# Patient Record
Sex: FEMALE | Race: WHITE | NOT HISPANIC OR LATINO | Employment: STUDENT | URBAN - METROPOLITAN AREA
[De-identification: names, ages, dates, MRNs, and addresses within clinical notes are randomized per-mention and may not be internally consistent; named-entity substitution may affect disease eponyms.]

---

## 2021-09-20 PROCEDURE — U0004 COV-19 TEST NON-CDC HGH THRU: HCPCS | Performed by: FAMILY MEDICINE

## 2022-02-25 ENCOUNTER — LAB (OUTPATIENT)
Dept: LAB | Facility: HOSPITAL | Age: 22
End: 2022-02-25

## 2022-02-25 ENCOUNTER — OFFICE VISIT (OUTPATIENT)
Dept: INTERNAL MEDICINE | Facility: CLINIC | Age: 22
End: 2022-02-25

## 2022-02-25 VITALS
TEMPERATURE: 97.1 F | HEIGHT: 64 IN | BODY MASS INDEX: 23.93 KG/M2 | OXYGEN SATURATION: 98 % | HEART RATE: 78 BPM | RESPIRATION RATE: 16 BRPM | DIASTOLIC BLOOD PRESSURE: 72 MMHG | WEIGHT: 140.2 LBS | SYSTOLIC BLOOD PRESSURE: 120 MMHG

## 2022-02-25 DIAGNOSIS — R21 FACIAL RASH: ICD-10-CM

## 2022-02-25 DIAGNOSIS — Z13.29 SCREENING FOR THYROID DISORDER: ICD-10-CM

## 2022-02-25 DIAGNOSIS — Z00.00 HEALTHCARE MAINTENANCE: ICD-10-CM

## 2022-02-25 DIAGNOSIS — R51.9 NEW ONSET HEADACHE: ICD-10-CM

## 2022-02-25 DIAGNOSIS — Z13.1 SCREENING FOR DIABETES MELLITUS: ICD-10-CM

## 2022-02-25 DIAGNOSIS — M25.40 JOINT SWELLING: ICD-10-CM

## 2022-02-25 DIAGNOSIS — M25.40 JOINT SWELLING: Primary | ICD-10-CM

## 2022-02-25 DIAGNOSIS — R42 VERTIGO: ICD-10-CM

## 2022-02-25 DIAGNOSIS — M94.0 COSTOCHONDRITIS: ICD-10-CM

## 2022-02-25 DIAGNOSIS — F41.8 ANXIETY ABOUT HEALTH: ICD-10-CM

## 2022-02-25 LAB
DEPRECATED RDW RBC AUTO: 37.1 FL (ref 37–54)
ERYTHROCYTE [DISTWIDTH] IN BLOOD BY AUTOMATED COUNT: 12.3 % (ref 12.3–15.4)
ERYTHROCYTE [SEDIMENTATION RATE] IN BLOOD: 10 MM/HR (ref 0–20)
HBA1C MFR BLD: 5.1 % (ref 4.8–5.6)
HCT VFR BLD AUTO: 39.7 % (ref 34–46.6)
HGB BLD-MCNC: 13.5 G/DL (ref 12–15.9)
MCH RBC QN AUTO: 28.9 PG (ref 26.6–33)
MCHC RBC AUTO-ENTMCNC: 34 G/DL (ref 31.5–35.7)
MCV RBC AUTO: 85 FL (ref 79–97)
PLATELET # BLD AUTO: 287 10*3/MM3 (ref 140–450)
PMV BLD AUTO: 10.7 FL (ref 6–12)
RBC # BLD AUTO: 4.67 10*6/MM3 (ref 3.77–5.28)
WBC NRBC COR # BLD: 5.19 10*3/MM3 (ref 3.4–10.8)

## 2022-02-25 PROCEDURE — 83036 HEMOGLOBIN GLYCOSYLATED A1C: CPT

## 2022-02-25 PROCEDURE — 86140 C-REACTIVE PROTEIN: CPT

## 2022-02-25 PROCEDURE — 80050 GENERAL HEALTH PANEL: CPT

## 2022-02-25 PROCEDURE — 93000 ELECTROCARDIOGRAM COMPLETE: CPT | Performed by: INTERNAL MEDICINE

## 2022-02-25 PROCEDURE — 86038 ANTINUCLEAR ANTIBODIES: CPT

## 2022-02-25 PROCEDURE — 85652 RBC SED RATE AUTOMATED: CPT

## 2022-02-25 PROCEDURE — 99215 OFFICE O/P EST HI 40 MIN: CPT | Performed by: INTERNAL MEDICINE

## 2022-02-25 NOTE — PROGRESS NOTES
Internal Medicine New Patient  Geena French is a 21 y.o. female who presents today to establish care and with concerns as outlined below.    Chief Complaint  Chief Complaint   Patient presents with   • Establish Care     Costrochondritis        HPI  Ms. French comes in today to establish care. She is here with her mom. She was last a patient of Dr. Myra De Los Santos in [place]. 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 No chronic health conditions and on no daily medications. Has albuterol that she was prescribed during viral URI a few months ago. She notes that she was diagnosed with costochondritis Nov 23 by [facility]. She has had persistent chest tightness, shortness of breath, cracking of sternum with twisting and reaching, tingling/pins and needles over sternum. She was recommended to stretch but has not had any improvement. A week or two prior to this she did have a viral infection, COVID19 test was negative. She repeated this last week and still negative. Starting a week and a half ago she began to have episodes of lightheadedness described as the room spinning and as if she may pass out. She has not had syncope with these episodes but does have a history of syncope when kneeling at [employer] felt to be due to position. She reports associated blurring of vision, light sensitivity, headache like pressure on top of her head, cognitive dysfunction with trouble finding words and slowed thinking, flushing of her face. She does note that once she feels an episode occurring she has become anxious and had panic attacks on two occasions. She has also had recent swelling of R 2nd and 3rd fingers which has resolved, body aches, and constipation. She notes only change is stopping OCP 2/2/2022. She denies increase stress or baseline anxiety.       Review of Systems  Review of Systems   Constitutional: Positive for fatigue. Negative for activity change, appetite change, chills, fever, unexpected weight gain and  unexpected weight loss.   Eyes: Positive for blurred vision and visual disturbance.   Respiratory: Positive for shortness of breath.    Cardiovascular: Positive for palpitations. Negative for chest pain and leg swelling.   Gastrointestinal: Positive for constipation.   Genitourinary: Negative.    Musculoskeletal: Positive for arthralgias and joint swelling.   Skin: Positive for rash.   Neurological: Positive for dizziness, tremors, speech difficulty (with episodes), weakness (generalized), light-headedness, headache and confusion (with episodes). Negative for seizures and syncope.   Psychiatric/Behavioral: Positive for stress. Negative for depressed mood. The patient is nervous/anxious.         Past Medical History  Past Medical History:   Diagnosis Date   • Costochondritis 11/23/2021   • Spondylolisthesis         Surgical History  Past Surgical History:   Procedure Laterality Date   • ADENOIDECTOMY     • TONSILLECTOMY     • WISDOM TOOTH EXTRACTION          Family History  Family History   Problem Relation Age of Onset   • Hyperlipidemia Mother    • Migraine headaches Mother    • Polymyalgia rheumatica Father    • Migraine headaches Sister    • Heart attack Maternal Grandmother 73   • No Known Problems Paternal Grandmother    • Skin cancer Paternal Grandfather    • Thyroid cancer Sister    • Lung cancer Maternal Uncle         smoker        Social History  Social History     Socioeconomic History   • Marital status: Single   Tobacco Use   • Smoking status: Never Smoker   • Smokeless tobacco: Never Used   Vaping Use   • Vaping Use: Former   Substance and Sexual Activity   • Alcohol use: Yes     Alcohol/week: 0.0 - 1.0 standard drinks     Comment: social   • Drug use: Not Currently   • Sexual activity: Not Currently        Current Medications  Current Outpatient Medications on File Prior to Visit   Medication Sig Dispense Refill   • albuterol sulfate  (90 Base) MCG/ACT inhaler Inhale 2 puffs Every 6 (Six) Hours  "As Needed for Wheezing. 18 g 0     No current facility-administered medications on file prior to visit.       Allergies  No Known Allergies     Objective  Visit Vitals  /72   Pulse 78   Temp 97.1 °F (36.2 °C)   Resp 16   Ht 162.6 cm (64.02\")   Wt 63.6 kg (140 lb 3.2 oz)   SpO2 98%   BMI 24.05 kg/m²        Physical Exam  Physical Exam  Vitals and nursing note reviewed.   Constitutional:       General: She is not in acute distress.     Appearance: Normal appearance. She is well-developed and normal weight. She is not ill-appearing, toxic-appearing or diaphoretic.   HENT:      Head: Normocephalic and atraumatic.      Comments: [place] hallpike performed. No nystagmus, was lightheaded when head turned to right.     Right Ear: Tympanic membrane, ear canal and external ear normal.      Left Ear: Tympanic membrane, ear canal and external ear normal.      Nose: Nose normal.      Mouth/Throat:      Mouth: Mucous membranes are moist.      Pharynx: Oropharynx is clear. No oropharyngeal exudate.   Eyes:      General: No scleral icterus.     Extraocular Movements: Extraocular movements intact.      Conjunctiva/sclera: Conjunctivae normal.      Pupils: Pupils are equal, round, and reactive to light.   Cardiovascular:      Rate and Rhythm: Normal rate and regular rhythm.      Heart sounds: Normal heart sounds. No murmur heard.      Pulmonary:      Effort: Pulmonary effort is normal. No respiratory distress.      Breath sounds: Normal breath sounds. No wheezing or rhonchi.   Chest:      Chest wall: Tenderness (mid sternum along costochondral junction) present. No deformity, swelling or crepitus.   Abdominal:      General: Bowel sounds are normal. There is no distension.      Palpations: Abdomen is soft. There is no mass.      Tenderness: There is no abdominal tenderness.   Musculoskeletal:         General: No swelling or deformity.      Cervical back: Neck supple.      Right lower leg: No edema.      Left lower leg: No edema. "   Lymphadenopathy:      Cervical: No cervical adenopathy.   Skin:     General: Skin is warm and dry.      Coloration: Skin is not jaundiced.      Findings: No rash.   Neurological:      General: No focal deficit present.      Mental Status: She is alert and oriented to person, place, and time.      Cranial Nerves: No cranial nerve deficit.      Motor: No weakness.      Gait: Gait normal.   Psychiatric:         Mood and Affect: Mood normal.         Behavior: Behavior normal.         Thought Content: Thought content normal.         Judgment: Judgment normal.            Results  Results for orders placed or performed during the hospital encounter of 09/20/21   COVID-19 PCR, [facility], NP SWAB IN [facility] VIRAL TRANSPORT MEDIA/ORAL SWISH 24-30 HR TAT - Swab, Nasopharynx    Specimen: Nasopharynx; Swab   Result Value Ref Range    SARS-CoV-2 JIMBO Not Detected Not Detected        ECG 12 Lead    Date/Time: 2/25/2022 12:27 PM  Performed by: Raquel Lala MD  Authorized by: Raquel Lala MD   Comparison: not compared with previous ECG   Previous ECG: no previous ECG available  Rhythm: sinus rhythm and sinus arrhythmia  Rate: normal  BPM: 84  Conduction: conduction normal  ST Segments: ST segments normal  T Waves: T waves normal  QRS axis: normal  Other: no other findings    Clinical impression: normal ECG          Assessment and Plan  Diagnoses and all orders for this visit:    Joint swelling, Costochondritis, Facial rash  - Recent onset of persistent sternal pain with associated episodes of lightheadedness, facial flushing, headache, and joint swelling on a few occasions.  - Given persistent chest pain ECG obtained, normal sinus rhythm  - Autoimmune disease is possible. Will obtain JEAN panel, ESR/CRP  - Will check TSH  - Recommend treatment of costochondritis with naproxen 500mg BID x 1 week    New onset headache  - No hx of headaches, could be new onset of complex migraine however given constellation of symptoms  will obtain MRI head wo contrast.    Vertigo  - Exam unremarkable. Tra hallpike without nystagmus but change in position did result in lightheadedness. BPPV remains possible but diagnosis of exclusion.  - MRI head as above  - CBC, CMP, TSH, A1c ordered  - If all negative will consider tilt table    Anxiety about health  - Has developed anxiety and panic attacks due to recent health issues. This may be contributing to some of her symptoms.        Health Maintenance   Topic Date Due   • ANNUAL PHYSICAL  Never done   • HPV VACCINES (1 - 2-dose series) Never done   • TDAP/TD VACCINES (1 - Tdap) Never done   • INFLUENZA VACCINE  Never done   • COVID-19 Vaccine (3 - Booster for Pfizer series) 09/23/2021   • HEPATITIS C SCREENING  Never done   • Pneumococcal Vaccine 0-64  Aged Out   • MENINGOCOCCAL VACCINE  Aged Out     Health Maintenance  - Pap smear: at age 22  - Mammogram: Start screening at age 40.  - Colonoscopy: Start screening at age 45.  - HCV: discuss at future visit  - Immunizations: discuss next visit.  - Depression screening: negative 2/2022    Return in about 2 weeks (around 3/11/2022) for Follow up 30 minutes, Labs today.    Today I have spent a total of 60 minutes caring for Geena French.  This includes time spent preparing for the visit, reviewing tests, performing a medically appropriate examination and/or evaluation , counseling and educating the patient/family/caregiver, ordering medications, tests, or procedures and documenting information in the medical record.

## 2022-02-26 LAB
ALBUMIN SERPL-MCNC: 4.5 G/DL (ref 3.5–5.2)
ALBUMIN/GLOB SERPL: 1.5 G/DL
ALP SERPL-CCNC: 54 U/L (ref 39–117)
ALT SERPL W P-5'-P-CCNC: 8 U/L (ref 1–33)
ANION GAP SERPL CALCULATED.3IONS-SCNC: 9.2 MMOL/L (ref 5–15)
AST SERPL-CCNC: 14 U/L (ref 1–32)
BILIRUB SERPL-MCNC: 0.3 MG/DL (ref 0–1.2)
BUN SERPL-MCNC: 9 MG/DL (ref 6–20)
BUN/CREAT SERPL: 11.7 (ref 7–25)
CALCIUM SPEC-SCNC: 10 MG/DL (ref 8.6–10.5)
CHLORIDE SERPL-SCNC: 104 MMOL/L (ref 98–107)
CO2 SERPL-SCNC: 25.8 MMOL/L (ref 22–29)
CREAT SERPL-MCNC: 0.77 MG/DL (ref 0.57–1)
CRP SERPL-MCNC: <0.3 MG/DL (ref 0–0.5)
GFR SERPL CREATININE-BSD FRML MDRD: 96 ML/MIN/1.73
GLOBULIN UR ELPH-MCNC: 3.1 GM/DL
GLUCOSE SERPL-MCNC: 86 MG/DL (ref 65–99)
POTASSIUM SERPL-SCNC: 4.2 MMOL/L (ref 3.5–5.2)
PROT SERPL-MCNC: 7.6 G/DL (ref 6–8.5)
SODIUM SERPL-SCNC: 139 MMOL/L (ref 136–145)
TSH SERPL DL<=0.05 MIU/L-ACNC: 0.92 UIU/ML (ref 0.27–4.2)

## 2022-02-28 LAB
ANA TITR SER IF: NEGATIVE {TITER}
LABORATORY COMMENT REPORT: NORMAL

## 2022-03-25 ENCOUNTER — HOSPITAL ENCOUNTER (OUTPATIENT)
Dept: MRI IMAGING | Facility: HOSPITAL | Age: 22
Discharge: HOME | End: 2022-03-25
Admitting: INTERNAL MEDICINE

## 2022-03-25 DIAGNOSIS — R42 VERTIGO: ICD-10-CM

## 2022-03-25 DIAGNOSIS — R51.9 NEW ONSET HEADACHE: ICD-10-CM

## 2022-03-25 PROCEDURE — 70551 MRI BRAIN STEM W/O DYE: CPT

## 2022-03-29 DIAGNOSIS — R42 VERTIGO: ICD-10-CM

## 2022-03-29 DIAGNOSIS — G93.89 BRAIN MASS: Primary | ICD-10-CM

## 2022-03-29 DIAGNOSIS — R51.9 NEW ONSET HEADACHE: ICD-10-CM

## 2022-04-06 ENCOUNTER — OFFICE VISIT (OUTPATIENT)
Dept: INTERNAL MEDICINE | Facility: CLINIC | Age: 22
End: 2022-04-06

## 2022-04-06 ENCOUNTER — TELEPHONE (OUTPATIENT)
Dept: INTERNAL MEDICINE | Facility: CLINIC | Age: 22
End: 2022-04-06

## 2022-04-06 VITALS
DIASTOLIC BLOOD PRESSURE: 72 MMHG | WEIGHT: 141 LBS | BODY MASS INDEX: 24.19 KG/M2 | OXYGEN SATURATION: 97 % | SYSTOLIC BLOOD PRESSURE: 108 MMHG | HEART RATE: 98 BPM | TEMPERATURE: 97.7 F

## 2022-04-06 DIAGNOSIS — D22.9 ATYPICAL MOLE: ICD-10-CM

## 2022-04-06 DIAGNOSIS — R51.9 NEW ONSET HEADACHE: ICD-10-CM

## 2022-04-06 DIAGNOSIS — M79.10 MYALGIA: ICD-10-CM

## 2022-04-06 DIAGNOSIS — G93.89 BRAIN MASS: Primary | ICD-10-CM

## 2022-04-06 DIAGNOSIS — B35.1 ONYCHOMYCOSIS: ICD-10-CM

## 2022-04-06 DIAGNOSIS — R53.82 CHRONIC FATIGUE: ICD-10-CM

## 2022-04-06 DIAGNOSIS — M94.0 COSTOCHONDRITIS: ICD-10-CM

## 2022-04-06 PROCEDURE — 99214 OFFICE O/P EST MOD 30 MIN: CPT | Performed by: INTERNAL MEDICINE

## 2022-04-06 RX ORDER — ACETAMINOPHEN 500 MG
TABLET ORAL
COMMUNITY
Start: 2022-03-29 | End: 2022-07-07

## 2022-04-06 NOTE — PROGRESS NOTES
Internal Medicine Follow Up    Chief Complaint  Geena French is a 21 y.o. female who presents today for follow up of chronic medical conditions outlined below.    Chief Complaint   Patient presents with   • Results     Follow up for results on MRI        HPI  Ms. French comes in today for follow up. She reports ongoing daily headache which she describes as constant and behind her eyes. Feels vision is out of focus. She has noted light sensitivity worsened by computer screen and fluorescent lighting at school. She is getting relief from headache with use of tylenol and NSAIDs. Her costochondritis continues intermittently in her R chest. Brought on by movement of torso or RUE. She has been using NSAIDs. She also continues to feel fatigued and notes overall muscle soreness. We reviewed her workup thus far. Next MRI is not until beginning of May. She has been doing her own research and is concerned about NMOSD.    In addition to above she notes discoloration of both great toes concerning for fungal infection and that she has a weird mole x2 months on her R flank.       Review of Systems  Review of Systems   Constitutional: Positive for fatigue. Negative for fever and unexpected weight loss.   Eyes: Positive for photophobia.   Respiratory: Negative.    Cardiovascular: Positive for chest pain. Negative for palpitations and leg swelling.   Musculoskeletal: Positive for myalgias.   Skin: Positive for skin lesions.        Fungal disease of toenails   Neurological: Positive for dizziness and headache.        Current Medications  Current Outpatient Medications on File Prior to Visit   Medication Sig Dispense Refill   • acetaminophen (TYLENOL) 500 MG tablet      • albuterol sulfate  (90 Base) MCG/ACT inhaler Inhale 2 puffs Every 6 (Six) Hours As Needed for Wheezing. 18 g 0     No current facility-administered medications on file prior to visit.       Allergies  No Known Allergies    Objective  Visit Vitals  BP  108/72   Pulse 98   Temp 97.7 °F (36.5 °C)   Wt 64 kg (141 lb)   SpO2 97%   BMI 24.19 kg/m²        Physical Exam  Physical Exam  Vitals and nursing note reviewed.   Constitutional:       General: She is not in acute distress.     Appearance: Normal appearance. She is well-developed and normal weight. She is not ill-appearing or toxic-appearing.   HENT:      Head: Normocephalic and atraumatic.   Eyes:      Conjunctiva/sclera: Conjunctivae normal.   Pulmonary:      Effort: Pulmonary effort is normal. No respiratory distress.   Skin:     General: Skin is warm and dry.      Findings: Lesion (mole with central scale on R flank) present.   Neurological:      General: No focal deficit present.      Mental Status: She is alert and oriented to person, place, and time. Mental status is at baseline.      Gait: Gait normal.         Results  Results for orders placed or performed in visit on 02/25/22   CBC (No Diff)    Specimen: Blood   Result Value Ref Range    WBC 5.19 3.40 - 10.80 10*3/mm3    RBC 4.67 3.77 - 5.28 10*6/mm3    Hemoglobin 13.5 12.0 - 15.9 g/dL    Hematocrit 39.7 34.0 - 46.6 %    MCV 85.0 79.0 - 97.0 fL    MCH 28.9 26.6 - 33.0 pg    MCHC 34.0 31.5 - 35.7 g/dL    RDW 12.3 12.3 - 15.4 %    RDW-SD 37.1 37.0 - 54.0 fl    MPV 10.7 6.0 - 12.0 fL    Platelets 287 140 - 450 10*3/mm3   Comprehensive Metabolic Panel    Specimen: Blood   Result Value Ref Range    Glucose 86 65 - 99 mg/dL    BUN 9 6 - 20 mg/dL    Creatinine 0.77 0.57 - 1.00 mg/dL    Sodium 139 136 - 145 mmol/L    Potassium 4.2 3.5 - 5.2 mmol/L    Chloride 104 98 - 107 mmol/L    CO2 25.8 22.0 - 29.0 mmol/L    Calcium 10.0 8.6 - 10.5 mg/dL    Total Protein 7.6 6.0 - 8.5 g/dL    Albumin 4.50 3.50 - 5.20 g/dL    ALT (SGPT) 8 1 - 33 U/L    AST (SGOT) 14 1 - 32 U/L    Alkaline Phosphatase 54 39 - 117 U/L    Total Bilirubin 0.3 0.0 - 1.2 mg/dL    eGFR Non African Amer 96 >60 mL/min/1.73    Globulin 3.1 gm/dL    A/G Ratio 1.5 g/dL    BUN/Creatinine Ratio 11.7 7.0 -  25.0    Anion Gap 9.2 5.0 - 15.0 mmol/L   TSH Rfx On Abnormal To Free T4    Specimen: Blood   Result Value Ref Range    TSH 0.923 0.270 - 4.200 uIU/mL   Hemoglobin A1c    Specimen: Blood   Result Value Ref Range    Hemoglobin A1C 5.10 4.80 - 5.60 %   JEAN by IFA, Reflex 9-biomarkers profile    Specimen: Blood   Result Value Ref Range    JEAN Negative     Please note Comment    Sedimentation rate, automated    Specimen: Blood   Result Value Ref Range    Sed Rate 10 0 - 20 mm/hr   C-reactive Protein    Specimen: Blood   Result Value Ref Range    C-Reactive Protein <0.30 0.00 - 0.50 mg/dL        Assessment and Plan  Diagnoses and all orders for this visit:    Brain mass and New onset headache  - Workup for new headaches with 1cm tectoral mass and recommendation for contrasted images which has been ordered but scheduled a month out.  - She has since developed difficulty with vision and dizziness. Given location of mass it is possible these are related.  - Will try to get MRI with and without contrast within the next week and neurosurgery consultation   - Discussed that if mass is benign after contrasted images and neurosurgery does not feel that surgery is indicated we would consider starting amitriptyline for her headaches. This may also help her chronic pain.    Costochondritis  - ECG previously NSR, autoimmune and inflammatory workup negative. Pain continues to clinically be MSK.  - Recommend trial of capsaicin cream    Chronic fatigue, Myalgia  - Workup negative, likely related to daily headaches and chronic pain    Atypical mole  - Has been present x2 months, will refer to dermatology    Onychomycosis  - Ciclopirox x48 weeks     Health Maintenance  - Pap smear: at age 22  - Mammogram: Start screening at age 40.  - Colonoscopy: Start screening at age 45.  - HCV: discuss at future visit  - Immunizations: discuss next visit.  - Depression screening: negative 2/2022    Return in about 4 weeks (around 5/4/2022) for Follow  up headaches 30 minutes.  Answers for HPI/ROS submitted by the patient on 4/5/2022  Please describe your symptoms.: Constant- , headache, Sometimes-, Eyes: Hard to focus, sensitive to light, feel like moving when eyes closed, vision feels off, Dizziness, weak, Muscle weakness/soreness: hard to write notes in the morning, stiff and sore body, Dizziness and eyes are worst in severity, Headaches are the most constant  Have you had these symptoms before?: Yes  How long have you been having these symptoms?: Greater than 2 weeks  Please list any medications you are currently taking for this condition.: Tylenol: Extra Strength  Please describe any probable cause for these symptoms. : Lipoma showed up in MRI, this is a follow up.  What is the primary reason for your visit?: Other

## 2022-04-06 NOTE — TELEPHONE ENCOUNTER
----- Message from Lizzy Barros MA sent at 4/6/2022  3:21 PM EDT -----  Regarding: FW: PreAuthorization Referral of MRI and other issue  Flower, could you address the first thing for her referral please?    Snow, would you help me understand if there is anything that can be done about the secondary request?    Thank you ladies!!  ----- Message -----  From: Geena French  Sent: 4/6/2022   3:18 PM EDT  To: Rose Srinivasan [facility]  Subject: PreAuthorization Referral of MRI and other i#    Hi,     I need my second MRI preauthorization to be sent to [facility] before I have my MRI done, and I was wondering if you could send that/have someone send that ASAP as I know we are trying to move my MRI up closer, and I won't be able to go until this is done!    Secondly,   I talked with the billing department and the blood tests I had done on Feb 25th to determine and rule out medical issues and conditions were marked as preventative care, due to the established care visit, but since I know that those are to determine my diagnosis, the insurance stated it needed to be identified as V700 (diagnostic testing) instead of what was listed as  (preventative testing). I talked with billing and was wondering if you could also confirm that this was only done because of my symptoms and to figure out the main cause and diagnosis.     Thanks for the help,   Geena French

## 2022-04-07 ENCOUNTER — HOSPITAL ENCOUNTER (OUTPATIENT)
Dept: MRI IMAGING | Facility: HOSPITAL | Age: 22
Discharge: HOME | End: 2022-04-07
Admitting: INTERNAL MEDICINE

## 2022-04-07 DIAGNOSIS — R42 VERTIGO: ICD-10-CM

## 2022-04-07 DIAGNOSIS — G93.89 BRAIN MASS: ICD-10-CM

## 2022-04-07 DIAGNOSIS — R51.9 NEW ONSET HEADACHE: ICD-10-CM

## 2022-04-07 PROCEDURE — 0 GADOBENATE DIMEGLUMINE 529 MG/ML SOLUTION: Performed by: INTERNAL MEDICINE

## 2022-04-07 PROCEDURE — 70553 MRI BRAIN STEM W/O & W/DYE: CPT

## 2022-04-07 PROCEDURE — A9577 INJ MULTIHANCE: HCPCS | Performed by: INTERNAL MEDICINE

## 2022-04-07 RX ADMIN — GADOBENATE DIMEGLUMINE 13 ML: 529 INJECTION, SOLUTION INTRAVENOUS at 13:10

## 2022-05-04 ENCOUNTER — OFFICE VISIT (OUTPATIENT)
Dept: NEUROSURGERY | Facility: CLINIC | Age: 22
End: 2022-05-04

## 2022-05-04 VITALS — WEIGHT: 137 LBS | TEMPERATURE: 98.2 F | HEIGHT: 64 IN | BODY MASS INDEX: 23.39 KG/M2

## 2022-05-04 DIAGNOSIS — G43.909 MIGRAINE WITHOUT STATUS MIGRAINOSUS, NOT INTRACTABLE, UNSPECIFIED MIGRAINE TYPE: ICD-10-CM

## 2022-05-04 DIAGNOSIS — D17.79 BRAIN LIPOMA: Primary | ICD-10-CM

## 2022-05-04 PROCEDURE — 99203 OFFICE O/P NEW LOW 30 MIN: CPT | Performed by: NEUROLOGICAL SURGERY

## 2022-05-04 NOTE — PROGRESS NOTES
Patient: Geena French  : 2000    Primary Care Provider: Raquel Lala MD    Requesting Provider: As above        History    Chief Complaint: Headache, visual obscuration, lightheadedness.    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  She also complains of some diminished balance.  Her headaches are bifrontal.  They are sometimes achy but at times pounding.  The vision difficulties and lightheadedness tend to be more episodic.  The headache was constant but there are periodic exacerbations.  Her mother and one of her sisters are treated for migraine headaches.  Her current symptoms are worse with loud noises, stress, light.  Tylenol is helpful.    Review of Systems   Constitutional: Positive for activity change and fatigue. Negative for appetite change, chills, diaphoresis, fever and unexpected weight change.   HENT: Positive for sinus pressure. Negative for congestion, dental problem, drooling, ear discharge, ear pain, facial swelling, hearing loss, mouth sores, nosebleeds, postnasal drip, rhinorrhea, sinus pain, sneezing, sore throat, tinnitus, trouble swallowing and voice change.    Eyes: Positive for photophobia, pain and visual disturbance. Negative for discharge, redness and itching.   Respiratory: Positive for chest tightness and shortness of breath. Negative for apnea, cough, choking, wheezing and stridor.    Cardiovascular: Positive for chest pain. Negative for palpitations and leg swelling.   Gastrointestinal: Positive for nausea. Negative for abdominal distention, abdominal pain, anal bleeding, blood in stool, constipation, diarrhea, rectal pain and vomiting.   Endocrine: Positive for heat intolerance. Negative for cold intolerance, polydipsia, polyphagia and polyuria.   Genitourinary: Negative for decreased urine volume, difficulty urinating, dyspareunia, dysuria, enuresis, flank pain, frequency,  "genital sores, hematuria, menstrual problem, pelvic pain, urgency, vaginal bleeding, vaginal discharge and vaginal pain.   Musculoskeletal: Positive for back pain. Negative for arthralgias, gait problem, joint swelling, myalgias, neck pain and neck stiffness.   Skin: Positive for color change and rash. Negative for pallor and wound.   Allergic/Immunologic: Negative for environmental allergies, food allergies and immunocompromised state.   Neurological: Positive for dizziness, tremors, speech difficulty, weakness, light-headedness and headaches. Negative for seizures, syncope, facial asymmetry and numbness.   Hematological: Negative for adenopathy. Does not bruise/bleed easily.   Psychiatric/Behavioral: Positive for agitation and decreased concentration. Negative for behavioral problems, confusion, dysphoric mood, hallucinations, self-injury, sleep disturbance and suicidal ideas. The patient is nervous/anxious. The patient is not hyperactive.        The patient's past medical history, past surgical history, family history, and social history have been reviewed at length in the electronic medical record.    Physical Exam:   Temp 98.2 °F (36.8 °C) (Infrared)   Ht 162.6 cm (64\")   Wt 62.1 kg (137 lb)   BMI 23.52 kg/m²   CONSTITUTIONAL: Patient is well-nourished, pleasant and appears stated age.  MUSCULOSKELETAL:  Neck tenderness to palpation is not observed.   ROM in the neck is normal.  NEUROLOGICAL:  Orientation, memory, attention span, language function, and cognition have been examined and are intact.  Strength is intact in the upper and lower extremities to direct testing.  Muscle tone is normal throughout.  Station and gait are normal.  Sensation is intact to light touch testing throughout.  Deep tendon reflexes are 2+ and symmetrical.  Caroline's Sign is negative bilaterally. No clonus is elicited.  Coordination is intact.  CRANIAL NERVES:  Cranial Nerve II: Visual fields are full to confrontation.  Cranial " Nerve III, IV, and VI: PERRLADC. Extraocular movements are intact.  Nystagmus is not present.  Cranial Nerve V: Facial sensation is intact to light touch.  Cranial Nerve VII: Muscles of facial expression demonstate no weakness or asymmetry.  Cranial Nerve VIII: Hearing is intact to finger rub bilaterally.  Cranial Nerve IX and X: Palate elevates symmetrically.  Cranial Nerve XI: Shoulder shrug is intact bilaterally.  Cranial Nerve XII: Tongue is midline without evidence of atrophy or fasciculation.    Medical Decision Making    Data Review:   (All imaging studies were personally reviewed unless stated otherwise)  MRI of the brain demonstrates an area of signal abnormality consistent with tectal lipoma.  There is no hydrocephalus.    Diagnosis:   1.  Headache syndrome quite possibly migrainous in nature.  2.  Incidental tectal lipoma.    Treatment Options:   I would recommend neurology referral to address her headache syndrome.  For completeness I am going to check a repeat MRI of the brain with and without gadolinium in 8 months to ensure stability of what is almost certainly a tectal lipoma.  This lesion is considered incidental.       Diagnosis Plan   1. Brain lipoma     2. Migraine without status migrainosus, not intractable, unspecified migraine type         Scribed for Kevin Thornton MD by Marcella Calabrese [facility] 5/4/2022 11:55 EDT      I, Dr. Thornton, personally performed the services described in the documentation, as scribed in my presence, and it is both accurate and complete.

## 2022-05-05 ENCOUNTER — OFFICE VISIT (OUTPATIENT)
Dept: INTERNAL MEDICINE | Facility: CLINIC | Age: 22
End: 2022-05-05

## 2022-05-05 VITALS
DIASTOLIC BLOOD PRESSURE: 78 MMHG | WEIGHT: 137 LBS | TEMPERATURE: 97.8 F | HEART RATE: 68 BPM | OXYGEN SATURATION: 96 % | BODY MASS INDEX: 23.52 KG/M2 | SYSTOLIC BLOOD PRESSURE: 100 MMHG

## 2022-05-05 DIAGNOSIS — D17.79 BRAIN LIPOMA: Primary | ICD-10-CM

## 2022-05-05 DIAGNOSIS — G43.009 MIGRAINE WITHOUT AURA AND WITHOUT STATUS MIGRAINOSUS, NOT INTRACTABLE: ICD-10-CM

## 2022-05-05 PROCEDURE — 99214 OFFICE O/P EST MOD 30 MIN: CPT | Performed by: INTERNAL MEDICINE

## 2022-05-05 RX ORDER — RIZATRIPTAN BENZOATE 10 MG/1
10 TABLET ORAL DAILY PRN
Qty: 9 TABLET | Refills: 2 | Status: SHIPPED | OUTPATIENT
Start: 2022-05-05

## 2022-05-05 NOTE — PROGRESS NOTES
Internal Medicine Follow Up    Chief Complaint  Geena French is a 22 y.o. female who presents today for follow up of chronic medical conditions outlined below.    Chief Complaint   Patient presents with   • Headache     Follow up        HPI  Ms. French comes in today with her mom for follow up on headaches. 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 She will have repeat MRI in about 8 months to document stability. She has noticed a general decrease in headaches and now gets about 2 per month. She notes that this does seem to occur with menstrual cycles. She has been using tylenol or Excedrin migraine with incomplete relief. She is interested in neurology referral due to ongoing issues.       Review of Systems  Review of Systems   Eyes: Positive for blurred vision and visual disturbance.   Neurological: Positive for dizziness, tremors, speech difficulty, weakness (generalized), light-headedness, headache and confusion.        Current Medications  Current Outpatient Medications on File Prior to Visit   Medication Sig Dispense Refill   • acetaminophen (TYLENOL) 500 MG tablet      • albuterol sulfate  (90 Base) MCG/ACT inhaler Inhale 2 puffs Every 6 (Six) Hours As Needed for Wheezing. 18 g 0   • ciclopirox (PENLAC) 8 % solution Apply  topically to the appropriate area as directed Every Night. Apply to affected nails and surrounding skin. Wipe clean with rubbing alcohol once weekly. 6 mL 2     No current facility-administered medications on file prior to visit.       Allergies  No Known Allergies    Objective  Visit Vitals  /78   Pulse 68   Temp 97.8 °F (36.6 °C)   Wt 62.1 kg (137 lb)   SpO2 96%   BMI 23.52 kg/m²        Physical Exam  Physical Exam  Vitals and nursing note reviewed.   Constitutional:       General: She is not in acute distress.     Appearance: Normal appearance. She is well-developed  and normal weight. She is not ill-appearing or toxic-appearing.   HENT:      Head: Normocephalic and atraumatic.   Eyes:      Conjunctiva/sclera: Conjunctivae normal.   Pulmonary:      Effort: Pulmonary effort is normal. No respiratory distress.   Skin:     General: Skin is warm and dry.   Neurological:      General: No focal deficit present.      Mental Status: She is alert and oriented to person, place, and time. Mental status is at baseline.      Gait: Gait normal.   Psychiatric:         Mood and Affect: Mood normal.         Behavior: Behavior normal.         Results  Results for orders placed or performed in visit on 02/25/22   CBC (No Diff)    Specimen: Blood   Result Value Ref Range    WBC 5.19 3.40 - 10.80 10*3/mm3    RBC 4.67 3.77 - 5.28 10*6/mm3    Hemoglobin 13.5 12.0 - 15.9 g/dL    Hematocrit 39.7 34.0 - 46.6 %    MCV 85.0 79.0 - 97.0 fL    MCH 28.9 26.6 - 33.0 pg    MCHC 34.0 31.5 - 35.7 g/dL    RDW 12.3 12.3 - 15.4 %    RDW-SD 37.1 37.0 - 54.0 fl    MPV 10.7 6.0 - 12.0 fL    Platelets 287 140 - 450 10*3/mm3   Comprehensive Metabolic Panel    Specimen: Blood   Result Value Ref Range    Glucose 86 65 - 99 mg/dL    BUN 9 6 - 20 mg/dL    Creatinine 0.77 0.57 - 1.00 mg/dL    Sodium 139 136 - 145 mmol/L    Potassium 4.2 3.5 - 5.2 mmol/L    Chloride 104 98 - 107 mmol/L    CO2 25.8 22.0 - 29.0 mmol/L    Calcium 10.0 8.6 - 10.5 mg/dL    Total Protein 7.6 6.0 - 8.5 g/dL    Albumin 4.50 3.50 - 5.20 g/dL    ALT (SGPT) 8 1 - 33 U/L    AST (SGOT) 14 1 - 32 U/L    Alkaline Phosphatase 54 39 - 117 U/L    Total Bilirubin 0.3 0.0 - 1.2 mg/dL    eGFR Non African Amer 96 >60 mL/min/1.73    Globulin 3.1 gm/dL    A/G Ratio 1.5 g/dL    BUN/Creatinine Ratio 11.7 7.0 - 25.0    Anion Gap 9.2 5.0 - 15.0 mmol/L   TSH Rfx On Abnormal To Free T4    Specimen: Blood   Result Value Ref Range    TSH 0.923 0.270 - 4.200 uIU/mL   Hemoglobin A1c    Specimen: Blood   Result Value Ref Range    Hemoglobin A1C 5.10 4.80 - 5.60 %   JEAN by IFA,  Reflex 9-biomarkers profile    Specimen: Blood   Result Value Ref Range    JEAN Negative     Please note Comment    Sedimentation rate, automated    Specimen: Blood   Result Value Ref Range    Sed Rate 10 0 - 20 mm/hr   C-reactive Protein    Specimen: Blood   Result Value Ref Range    C-Reactive Protein <0.30 0.00 - 0.50 mg/dL        Assessment and Plan  Diagnoses and all orders for this visit:    Brain lipoma  - Incidental finding, unrelated to current neurological issues or headaches per Dr. Thornton. Will have repeat MRI in 8 months to document stability.    Migraine without aura and without status migrainosus, not intractable  - Has many symptoms including headaches which may be a form of migraine.  - Headache frequency has decreased since her last visit, now 2 per month. I no longer find it necessary to start a migraine prophylactic.   - Will provide her with maxalt 10mg PRN to use in conjunction with her pain reliever of choice. Discussed avoidance of overuse.  - Will also refer to neurology given her complex of symptoms     Health Maintenance  - Pap smear: next visit  - Mammogram: Start screening at age 40.  - Colonoscopy: Start screening at age 46.  - HCV: discuss at future visit  - Immunizations: COVID UTD. HPV complete. Tdap 2014.  - Depression screening: negative 2/2022    Return in about 2 months (around 7/5/2022) for Follow up migraines and pap smear 30 minutes.  Answers for HPI/ROS submitted by the patient on 5/3/2022  Please describe your symptoms.: Follow up after neurosurgeon visit on 5/4/2022  Have you had these symptoms before?: Yes  How long have you been having these symptoms?: Greater than 2 weeks  Please list any medications you are currently taking for this condition.: Tylenol extra strength  Please describe any probable cause for these symptoms. : Brain lipoma  What is the primary reason for your visit?: Other

## 2022-05-06 ENCOUNTER — APPOINTMENT (OUTPATIENT)
Dept: MRI IMAGING | Facility: HOSPITAL | Age: 22
End: 2022-05-06

## 2022-07-07 ENCOUNTER — OFFICE VISIT (OUTPATIENT)
Dept: NEUROLOGY | Facility: CLINIC | Age: 22
End: 2022-07-07

## 2022-07-07 ENCOUNTER — OFFICE VISIT (OUTPATIENT)
Dept: INTERNAL MEDICINE | Facility: CLINIC | Age: 22
End: 2022-07-07

## 2022-07-07 VITALS
BODY MASS INDEX: 23.73 KG/M2 | HEIGHT: 64 IN | SYSTOLIC BLOOD PRESSURE: 110 MMHG | HEART RATE: 89 BPM | DIASTOLIC BLOOD PRESSURE: 70 MMHG | WEIGHT: 139 LBS | OXYGEN SATURATION: 97 %

## 2022-07-07 VITALS
BODY MASS INDEX: 23.73 KG/M2 | DIASTOLIC BLOOD PRESSURE: 68 MMHG | TEMPERATURE: 97.9 F | HEIGHT: 64 IN | WEIGHT: 139 LBS | HEART RATE: 94 BPM | SYSTOLIC BLOOD PRESSURE: 104 MMHG | RESPIRATION RATE: 16 BRPM | OXYGEN SATURATION: 98 %

## 2022-07-07 DIAGNOSIS — G43.009 MIGRAINE WITHOUT AURA AND WITHOUT STATUS MIGRAINOSUS, NOT INTRACTABLE: Primary | ICD-10-CM

## 2022-07-07 DIAGNOSIS — Z30.41 ENCOUNTER FOR SURVEILLANCE OF CONTRACEPTIVE PILLS: ICD-10-CM

## 2022-07-07 DIAGNOSIS — Z12.4 CERVICAL CANCER SCREENING: ICD-10-CM

## 2022-07-07 PROCEDURE — 99214 OFFICE O/P EST MOD 30 MIN: CPT | Performed by: INTERNAL MEDICINE

## 2022-07-07 PROCEDURE — 99203 OFFICE O/P NEW LOW 30 MIN: CPT | Performed by: PSYCHIATRY & NEUROLOGY

## 2022-07-07 RX ORDER — NORETHINDRONE ACETATE AND ETHINYL ESTRADIOL 1; .02 MG/1; MG/1
1 TABLET ORAL DAILY
Qty: 21 TABLET | Refills: 11 | Status: SHIPPED | OUTPATIENT
Start: 2022-07-07

## 2022-07-07 NOTE — PROGRESS NOTES
Subjective:    CC: Geena French is seen today in consultation at the request of Raquel Lala MD for Migraine       HPI:  22-year-old female with no significant past medical history presents with headaches.  As per patient she started having headaches in February of this year.  This was 2 weeks after she stopped her birth control pills.  With the headaches she was getting pain behind her eyes on both sides with nausea, dizziness, fatigue, photophobia and phonophobia.  She also had difficulty focusing on objects.  She wears glasses but her vision has not changed recently.  She saw an ophthalmologist who told her that her eye examination was normal.  Her PCP prescribed her Maxalt which helps.  She also ordered a MRI brain which showed a small left tectal lipoma (for which she does not need any neurosurgical intervention).  She states that over time her headaches have improved and she has not had any headaches for the past month.  About 1 week ago she started birth control pills again (norethindrone acetate/estradiol-1 mg/0.02 mg) but that has not triggered her headaches either.  Of note-I personally reviewed her MRI brain    The following portions of the patient's history were reviewed today and updated as of 07/07/2022  : allergies, current medications, past family history, past medical history, past social history, past surgical history and problem list  These document will be scanned to patient's chart.      Current Outpatient Medications:   •  albuterol sulfate  (90 Base) MCG/ACT inhaler, Inhale 2 puffs Every 6 (Six) Hours As Needed for Wheezing., Disp: 18 g, Rfl: 0  •  ciclopirox (PENLAC) 8 % solution, Apply  topically to the appropriate area as directed Every Night. Apply to affected nails and surrounding skin. Wipe clean with rubbing alcohol once weekly., Disp: 6 mL, Rfl: 2  •  rizatriptan (Maxalt) 10 MG tablet, Take 1 tablet by mouth Daily As Needed for Migraine. May repeat in 2 hours if  "needed, Disp: 9 tablet, Rfl: 2   Past Medical History:   Diagnosis Date   • Brain concussion 2016    Due to volleyball   • Costochondritis 2021   • History of medical problems Spondylolithesis: 2018   • Low back pain 2016    Due to Spondy   • Lumbosacral disc disease 2016    Bulging Disk due to Spondylolithesis   • Spondylolisthesis       Past Surgical History:   Procedure Laterality Date   • ADENOIDECTOMY     • TONSILLECTOMY     • WISDOM TOOTH EXTRACTION        Family History   Problem Relation Age of Onset   • Hyperlipidemia Mother    • Migraine headaches Mother    • Other Mother         High Cholestrol   • Polymyalgia rheumatica Father    • Other Father         PMR   • Migraine headaches Sister    • Heart attack Maternal Grandmother 73   • Heart disease Maternal Grandmother         Heart Attack   • No Known Problems Paternal Grandmother    • Skin cancer Paternal Grandfather    • Cancer Paternal Grandfather         Skin Cancer   • Thyroid cancer Sister    • Lung cancer Maternal Uncle         smoker   • Cancer Maternal Uncle         Lung Cancer,  at 57   • Thyroid disease Sister       Social History     Socioeconomic History   • Marital status: Single   Tobacco Use   • Smoking status: Never Smoker   • Smokeless tobacco: Never Used   Vaping Use   • Vaping Use: Former   Substance and Sexual Activity   • Alcohol use: Yes     Alcohol/week: 2.0 standard drinks     Types: 2 Shots of liquor per week     Comment: social   • Drug use: Never   • Sexual activity: Not Currently     Partners: Male     Birth control/protection: Condom     Review of Systems   Neurological: Positive for headache.   All other systems reviewed and are negative.      Objective:    /70   Pulse 89   Ht 162.6 cm (64.02\")   Wt 63 kg (139 lb)   SpO2 97%   BMI 23.85 kg/m²     Neurology Exam:    General apperance: NAD.     Mental status: Alert, awake and oriented to time place and person.    Recent and " Remote memory: Intact.    Attention span and Concentration: Normal.     Language and Speech: Intact- No dysarthria.    Fluency, Naming , Repitition and Comprehension:  Intact    Cranial Nerves:   CN II: Visual fields are full. Intact. Fundi - Normal, No papillederma, Pupils - NERIS  CN III, IV and VI: Extraocular movements are intact. Normal saccades.   CN V: Facial sensation is intact.   CN VII: Muscles of facial expression reveal no asymmetry. Intact.   CN VIII: Hearing is intact. Whispered voice intact.   CN IX and X: Palate elevates symmetrically. Intact  CN XI: Shoulder shrug is intact.   CN XII: Tongue is midline without evidence of atrophy or fasciculation.     Ophthalmoscopic exam of optic disc-normal    Motor:  Right UE muscle strength 5/5. Normal tone.     Left UE muscle strength 5/5. Normal tone.      Right LE muscle strength5/5. Normal tone.     Left LE muscle strength 5/5. Normal tone.      Sensory: Normal light touch, vibration and pinprick sensation bilaterally.    DTRs: 2+ bilaterally in upper and lower extremities.    Babinski: Negative bilaterally.    Co-ordination: Normal finger-to-nose, heel to shin B/L.    Rhomberg: Negative.    Gait: Normal.    Cardiovascular: Regular rate and rhythm without murmur, gallop or rub.    Assessment and Plan:  1. Migraine without aura and without status migrainosus, not intractable  Patient's migraine headaches could have been triggered by stopping her OCPs.  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   Since her headaches have improved I will defer starting her on a prophylactic medication  I have told her to start magnesium oxide supplements, keep her self well-hydrated and take Maxalt 10 mg as needed    Return in about 3 months (around 10/7/2022).     I spent over 35 minutes with the patient face to face out of which over 50% (25 minutes) was spent in management, instructions and education.     Pennie Lemus MD
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
## 2022-07-19 LAB — REF LAB TEST METHOD: NORMAL

## 2022-10-13 ENCOUNTER — OFFICE VISIT (OUTPATIENT)
Dept: NEUROLOGY | Facility: CLINIC | Age: 22
End: 2022-10-13

## 2022-10-13 VITALS
SYSTOLIC BLOOD PRESSURE: 110 MMHG | DIASTOLIC BLOOD PRESSURE: 60 MMHG | BODY MASS INDEX: 24.03 KG/M2 | HEART RATE: 79 BPM | WEIGHT: 140 LBS | OXYGEN SATURATION: 98 %

## 2022-10-13 DIAGNOSIS — G43.009 MIGRAINE WITHOUT AURA AND WITHOUT STATUS MIGRAINOSUS, NOT INTRACTABLE: Primary | ICD-10-CM

## 2022-10-13 PROCEDURE — 99213 OFFICE O/P EST LOW 20 MIN: CPT | Performed by: PSYCHIATRY & NEUROLOGY

## 2022-10-13 RX ORDER — TRIAMCINOLONE ACETONIDE 0.25 MG/G
CREAM TOPICAL
COMMUNITY
Start: 2022-07-15

## 2022-10-13 NOTE — PROGRESS NOTES
Subjective:    CC: Geena French is seen today  for Migraine       Current visit-patient states that her headache frequency has continued to improve.  She has only had about 2 headaches in the past 3 months for which she took Maxalt that was effective.  She has not started the magnesium supplements.  Continues to take her birth control pill.    Initial [id number]old female with no significant past medical history presents with headaches.  As per patient she started having headaches in February of this year.  This was 2 weeks after she stopped her birth control pills.  With the headaches she was getting pain behind her eyes on both sides with nausea, dizziness, fatigue, photophobia and phonophobia.  She also had difficulty focusing on objects.  She wears glasses but her vision has not changed recently.  She saw an ophthalmologist who told her that her eye examination was normal.  Her PCP prescribed her Maxalt which helps.  She also ordered a MRI brain which showed a small left tectal lipoma (for which she does not need any neurosurgical intervention).  She states that over time her headaches have improved and she has not had any headaches for the past month.  About 1 week ago she started birth control pills again (norethindrone acetate/estradiol-1 mg/0.02 mg) but that has not triggered her headaches either.  Of note-I personally reviewed her MRI brain    The following portions of the patient's history were reviewed today and updated as of 07/07/2022  : allergies, current medications, past family history, past medical history, past social history, past surgical history and problem list  These document will be scanned to patient's chart.      Current Outpatient Medications:   •  albuterol sulfate  (90 Base) MCG/ACT inhaler, Inhale 2 puffs Every 6 (Six) Hours As Needed for Wheezing., Disp: 18 g, Rfl: 0  •  ciclopirox (PENLAC) 8 % solution, Apply  topically to the appropriate area as directed Every Night.  Apply to affected nails and surrounding skin. Wipe clean with rubbing alcohol once weekly., Disp: 6 mL, Rfl: 2  •  norethindrone-ethinyl estradiol (MICROGESTIN ) 1-20 MG-MCG per tablet, Take 1 tablet by mouth Daily., Disp: 21 tablet, Rfl: 11  •  rizatriptan (Maxalt) 10 MG tablet, Take 1 tablet by mouth Daily As Needed for Migraine. May repeat in 2 hours if needed, Disp: 9 tablet, Rfl: 2  •  triamcinolone (KENALOG) 0.025 % cream, APPLY TO NECK TWICE A DAY FOR NO MORE THAN 2 WEEKS., Disp: , Rfl:    Past Medical History:   Diagnosis Date   • Brain concussion 2016    Due to volleyball   • Costochondritis 2021   • History of medical problems Spondylolithesis: 2018   • Low back pain 2016    Due to Spondy   • Lumbosacral disc disease 2016    Bulging Disk due to Spondylolithesis   • Migraine 2022    Migraine Episodes   • Spondylolisthesis       Past Surgical History:   Procedure Laterality Date   • ADENOIDECTOMY     • TONSILLECTOMY     • WISDOM TOOTH EXTRACTION        Family History   Problem Relation Age of Onset   • Hyperlipidemia Mother    • Migraine headaches Mother    • Other Mother         High Cholestrol   • Migraines Mother    • Polymyalgia rheumatica Father    • Other Father         PMR   • Migraine headaches Sister    • Heart attack Maternal Grandmother 73   • Heart disease Maternal Grandmother         Heart Attack   • Dementia Paternal Grandmother    • Skin cancer Paternal Grandfather    • Cancer Paternal Grandfather         Skin Cancer   • Thyroid cancer Sister    • Lung cancer Maternal Uncle         smoker   • Cancer Maternal Uncle         Lung Cancer,  at 57   • Thyroid disease Sister       Social History     Socioeconomic History   • Marital status: Single   Tobacco Use   • Smoking status: Never   • Smokeless tobacco: Never   Vaping Use   • Vaping Use: Former   Substance and Sexual Activity   • Alcohol use: Yes     Alcohol/week: 2.0 standard drinks      Types: 2 Shots of liquor per week     Comment: social   • Drug use: Never   • Sexual activity: Yes     Partners: Male     Birth control/protection: Condom, Pill     Review of Systems   Neurological: Positive for headache.   All other systems reviewed and are negative.      Objective:    /60   Pulse 79   Wt 63.5 kg (140 lb)   SpO2 98%   BMI 24.03 kg/m²     Neurology Exam:    General apperance: NAD.     Mental status: Alert, awake and oriented to time place and person.    Recent and Remote memory: Intact.    Attention span and Concentration: Normal.     Language and Speech: Intact- No dysarthria.    Fluency, Naming , Repitition and Comprehension:  Intact    Cranial Nerves:   CN II: Visual fields are full. Intact. Fundi - Normal, No papillederma, Pupils - NERIS  CN III, IV and VI: Extraocular movements are intact. Normal saccades.   CN V: Facial sensation is intact.   CN VII: Muscles of facial expression reveal no asymmetry. Intact.   CN VIII: Hearing is intact. Whispered voice intact.   CN IX and X: Palate elevates symmetrically. Intact  CN XI: Shoulder shrug is intact.   CN XII: Tongue is midline without evidence of atrophy or fasciculation.     Ophthalmoscopic exam of optic disc-normal    Motor:  Right UE muscle strength 5/5. Normal tone.     Left UE muscle strength 5/5. Normal tone.      Right LE muscle strength5/5. Normal tone.     Left LE muscle strength 5/5. Normal tone.      Sensory: Normal light touch, vibration and pinprick sensation bilaterally.    DTRs: 2+ bilaterally in upper and lower extremities.    Babinski: Negative bilaterally.    Co-ordination: Normal finger-to-nose, heel to shin B/L.    Rhomberg: Negative.    Gait: Normal.    Cardiovascular: Regular rate and rhythm without murmur, gallop or rub.    Assessment and Plan:  1. Migraine without aura and without status migrainosus, not intractable  Patient's migraine headaches could have been triggered by stopping her OCPs.  MRI brain shows a  left tectal lipoma for which she does not need any surgical intervention.   Since her headaches are well controlled she does not have to be on a prophylactic medication  She should continue Maxalt 10 mg as needed.  Is also keeping herself well-hydrated    Return in about 9 months (around 7/13/2023).         Pennie Lemus MD

## 2022-10-14 ENCOUNTER — PATIENT MESSAGE (OUTPATIENT)
Dept: NEUROLOGY | Facility: CLINIC | Age: 22
End: 2022-10-14

## 2022-10-14 ENCOUNTER — PATIENT ROUNDING (BHMG ONLY) (OUTPATIENT)
Dept: NEUROLOGY | Facility: CLINIC | Age: 22
End: 2022-10-14

## 2022-10-14 NOTE — PROGRESS NOTES
October 14, 2022    Hello, may I speak with Geena French?    My name is Eva.      I am  with [facility]  [address] [address] [phone].    Patient rounding questions sent through [employer].

## 2022-12-16 ENCOUNTER — HOSPITAL ENCOUNTER (OUTPATIENT)
Dept: MRI IMAGING | Facility: HOSPITAL | Age: 22
Discharge: HOME | End: 2022-12-16
Admitting: NEUROLOGICAL SURGERY

## 2022-12-16 DIAGNOSIS — D17.79 BRAIN LIPOMA: ICD-10-CM

## 2022-12-16 PROCEDURE — 0 GADOBENATE DIMEGLUMINE 529 MG/ML SOLUTION: Performed by: NEUROLOGICAL SURGERY

## 2022-12-16 PROCEDURE — A9577 INJ MULTIHANCE: HCPCS | Performed by: NEUROLOGICAL SURGERY

## 2022-12-16 PROCEDURE — 70553 MRI BRAIN STEM W/O & W/DYE: CPT

## 2022-12-16 RX ADMIN — GADOBENATE DIMEGLUMINE 13 ML: 529 INJECTION, SOLUTION INTRAVENOUS at 09:12

## 2023-01-11 ENCOUNTER — OFFICE VISIT (OUTPATIENT)
Dept: NEUROSURGERY | Facility: CLINIC | Age: 23
End: 2023-01-11
Payer: COMMERCIAL

## 2023-01-11 VITALS — TEMPERATURE: 97.8 F | BODY MASS INDEX: 24.09 KG/M2 | WEIGHT: 141.1 LBS | HEIGHT: 64 IN

## 2023-01-11 DIAGNOSIS — D17.79 BRAIN LIPOMA: Primary | ICD-10-CM

## 2023-01-11 PROCEDURE — 99213 OFFICE O/P EST LOW 20 MIN: CPT | Performed by: NEUROLOGICAL SURGERY

## 2023-01-11 NOTE — PROGRESS NOTES
Patient: Geena French  : 2000    Primary Care Provider: Raquel Lala MD    Requesting Provider: As above        History    Chief Complaint: Headache, visual obscuration, lightheadedness.    History of Present Illness: Ms. French is a 22-year-old right-handed student who since 2022 has had the above-noted complaints.  I last saw her in May 2022.  She complained of balance problems and bifrontal headaches.  She had a strong family history of migraine headache.  Studies demonstrated what was felt to be a tectal lipoma that was incidental.  She has been taking medications when needed for her migraine headaches and her headache syndrome is overall better.  She still has some visual obscuration with the headaches.  Outside of that she has no visual problems.    Review of Systems   Constitutional: Negative for activity change, appetite change, chills, diaphoresis, fatigue, fever and unexpected weight change.   HENT: Negative for congestion, dental problem, drooling, ear discharge, ear pain, facial swelling, hearing loss, mouth sores, nosebleeds, postnasal drip, rhinorrhea, sinus pressure, sinus pain, sneezing, sore throat, tinnitus, trouble swallowing and voice change.    Eyes: Negative for photophobia, pain, discharge, redness, itching and visual disturbance.   Respiratory: Negative for apnea, cough, choking, chest tightness, shortness of breath, wheezing and stridor.    Cardiovascular: Negative for chest pain, palpitations and leg swelling.   Gastrointestinal: Negative for abdominal distention, abdominal pain, anal bleeding, blood in stool, constipation, diarrhea, nausea, rectal pain and vomiting.   Endocrine: Negative for cold intolerance, heat intolerance, polydipsia, polyphagia and polyuria.   Genitourinary: Negative for decreased urine volume, difficulty urinating, dysuria, enuresis, flank pain, frequency, genital sores, hematuria and urgency.   Musculoskeletal: Negative for  "arthralgias, back pain, gait problem, joint swelling, myalgias, neck pain and neck stiffness.   Skin: Negative for color change, pallor, rash and wound.   Allergic/Immunologic: Negative for environmental allergies, food allergies and immunocompromised state.   Neurological: Negative for dizziness, tremors, seizures, syncope, facial asymmetry, speech difficulty, weakness, light-headedness, numbness and headaches.   Hematological: Negative for adenopathy. Does not bruise/bleed easily.   Psychiatric/Behavioral: Negative for agitation, behavioral problems, confusion, decreased concentration, dysphoric mood, hallucinations, self-injury, sleep disturbance and suicidal ideas. The patient is not nervous/anxious and is not hyperactive.        The patient's past medical history, past surgical history, family history, and social history have been reviewed at length in the electronic medical record.      Physical Exam:   Temp 97.8 °F (36.6 °C)   Ht 162.6 cm (64\")   Wt 64 kg (141 lb 1.6 oz)   BMI 24.22 kg/m²   Visual fields are full to confrontation.  Extraocular movements are intact.    Medical Decision Making    Data Review:   (All imaging studies were personally reviewed unless stated otherwise)  Follow-up MRI of the brain dated 12/16/2022 is compared to the study from 4/7/2022.  There is no change in the lesion at the tectum suggestive of lipoma.  The radiologist measured this at 10 x 7 mm.    Diagnosis:   1.  Migraine headache syndrome, improved.  2.  Incidental tectal lipoma.    Treatment Options:   For completeness I have ordered a new MRI of the brain without gadolinium in 2.5 years.  She will follow-up thereafter.  If this study is unchanged then we will hold off on further imaging.       Diagnosis Plan   1. Brain lipoma            Scribed for Kevin Thornton MD by ROB Cummings 1/11/2023 08:41 EST      I, Dr. Thornton, personally performed the services described in the documentation, as scribed in my presence, " and it is both accurate and complete.

## 2023-02-04 DIAGNOSIS — Z30.41 ENCOUNTER FOR SURVEILLANCE OF CONTRACEPTIVE PILLS: ICD-10-CM

## 2023-02-06 RX ORDER — NORETHINDRONE ACETATE AND ETHINYL ESTRADIOL 1; 20 MG/1; UG/1
TABLET ORAL
Qty: 63 TABLET | Refills: 3 | OUTPATIENT
Start: 2023-02-06